# Patient Record
(demographics unavailable — no encounter records)

---

## 2024-11-04 NOTE — HISTORY OF PRESENT ILLNESS
[de-identified] : s/t x 2 day, cough on and off  [FreeTextEntry6] : No fever Sore throat x 2 days Cough x 1 day No runny nose, nasal congestion No chest pain or SOB No vomiting, no diarrhea appetite decreased, + fluids normal UOP No known covid contacts

## 2024-11-04 NOTE — DISCUSSION/SUMMARY
[FreeTextEntry1] : Symptomatic treatment of fever and/or pain discussed Stat strep test ordered Throat culture, if POSITIVE, give Duricef 500/5 3.75 ml BID x 10 days Hydrate well Handwashing and infection control discussed Return to office if symptoms persist, worsen or febrile

## 2025-05-05 NOTE — HISTORY OF PRESENT ILLNESS
[Mother] : mother [Yes] : Patient goes to dentist yearly [Toothpaste] : Primary Fluoride Source: Toothpaste [No] : No cigarette smoke exposure [Appropriately restrained in motor vehicle] : appropriately restrained in motor vehicle [Supervised outdoor play] : supervised outdoor play [Supervised around water] : supervised around water [Wears helmet and pads] : wears helmet and pads [NO] : No [FreeTextEntry7] : 8 yr well [FreeTextEntry1] : lives with parents in grade 2nd doing well in school, likes teacher, has friends, no bullying no problems in school identified, no ADHD concerns participates in sports  no history of injury  and  patient is doing well - has no concerns or issues. appetite good, eats variety of foods, 3 meals a day and snacks, consumes fruits, vegetables, meat, dairy no sleep concerns, goes to dentist regularly, brushing teeth 1-2 x a day (tries 2 x a day) patient not having any fevers without a cause, pain that wakes them in the night, or night sweats. Urinating and stooling normally, no chest pain, palpitations or syncope with exercise. Parent(s) have no current concerns or issues

## 2025-05-05 NOTE — PHYSICAL EXAM
[TextEntry] : General: awake, alert, no acute distress, interactive, cooperative, appropriate for age Head: normocephalic, no signs injury Eyes: + red reflex bilaterally, EOMI, no purulent discharge, no conjunctival or scleral erythema Ears: tympanic membranes normal appearing bilaterally, no ear pit or tag Nose: no discharge Mouth: mucosa moist and pink, oropharynx without erythema Neck: supple, FROM Lungs: clear to auscultation bilaterally, no accessory muscle use Cardiac: normal S1 S2, regular rate and rhythm, no murmur appreciated Abdomen: soft, non tender, non distended, no hepatosplenomegaly  Chest: no gynecomastia Genitals: marjorie 1 normal appearing male genitalia, testicles descended bilaterally Lymphatics: no abnormal lymph nodes palpated Back: no scoliosis noted Skin: no rash, no lesions

## 2025-07-23 NOTE — HISTORY OF PRESENT ILLNESS
[de-identified] : sore throat x2 days  [FreeTextEntry6] : Sore throat no fever for 1 day no respiratory distress, no wheezing or dyspnea. No rashes, no lethargy, no myalgia. No abdominal pain, no vomiting or diarrhea, stooling normally. Voiding normally, eating and drinking well. No concern for dehydration.   No sick contacts. No recent travel. No other concerns at this time

## 2025-07-23 NOTE — HISTORY OF PRESENT ILLNESS
[de-identified] : sore throat x2 days  [FreeTextEntry6] : Sore throat no fever for 1 day no respiratory distress, no wheezing or dyspnea. No rashes, no lethargy, no myalgia. No abdominal pain, no vomiting or diarrhea, stooling normally. Voiding normally, eating and drinking well. No concern for dehydration.   No sick contacts. No recent travel. No other concerns at this time

## 2025-07-23 NOTE — PHYSICAL EXAM
[Tired appearing] : not tired appearing [Lethargic] : not lethargic [Toxic] : not toxic [Erythema] : no erythema [Erythematous Oropharynx] : erythematous oropharynx [Enlarged Tonsils] : enlarged tonsils [Vesicles] : no vesicles [Exudate] : no exudate [Ulcerative Lesions] : no ulcerative lesions [Palate petechiae] : palate without petechiae [Wheezing] : no wheezing [Rales] : no rales [Crackles] : no crackles [Transmitted Upper Airway Sounds] : no transmitted upper airway sounds [Rhonchi] : no rhonchi [NL] : warm, clear

## 2025-07-23 NOTE — PLAN
[TextEntry] : ax to amoxicillin -- can tolerate cephalosporins per mother, has had it before Take antibiotics as prescribed, take entirety of course even if child is feeling better or fever free. Continue Acetaminophen and ibuprofen as needed for pain/fever/discomfort. Rest and keep hydrated. Return to school/activities once fever free for minimum of 24 hours off of antipyretics or/and on antibiotics for at least 24 hours. Once on antibiotics for 24 hours, replace toothbrush, toothpaste, wash sheets/pillowcases, and wash pets' fur if application. Can use saltwater gargles if applicable to age and situation.   If symptoms worsen or persist, return to office.